# Patient Record
(demographics unavailable — no encounter records)

---

## 2025-06-19 NOTE — REASON FOR VISIT
[Initial Evaluation] : an initial evaluation for [FreeTextEntry2] : Referred by Dr Naldo Garcia for headaches, septal perf

## 2025-06-19 NOTE — HISTORY OF PRESENT ILLNESS
[de-identified] : 40 year old male presents for headache, septal perf  Referred by Dr Naldo Garcia  Longstanding of right sided frontal headaches, worse for the past 18 months, more frequent- often debilitating and associated with nausea/vomiting States headaches can last throughout the entire day  Takes Advil & Excedrin as needed with some relief  Prior substance abuse history (oxy, cocaine)-10 years sober- noted septal perf by prior ENT    hx allergies- intermittent nasal congestion, frequent yellow/green crusting  Intermittent PND Occasional blood tinged drainage-denies active bleeds  Sense of smell slowly decreasing  Denies recurrent sinus infections  Used Flonase daily a few years ago- now using as needed Denies CT 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